# Patient Record
Sex: MALE | Race: WHITE | NOT HISPANIC OR LATINO
[De-identification: names, ages, dates, MRNs, and addresses within clinical notes are randomized per-mention and may not be internally consistent; named-entity substitution may affect disease eponyms.]

---
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
## 2021-02-04 ENCOUNTER — APPOINTMENT (OUTPATIENT)
Dept: PEDIATRICS | Facility: CLINIC | Age: 21
End: 2021-02-04
Payer: MEDICAID

## 2021-02-04 VITALS — TEMPERATURE: 99.1 F

## 2021-02-04 DIAGNOSIS — G43.909 MIGRAINE, UNSPECIFIED, NOT INTRACTABLE, W/OUT STATUS MIGRAINOSUS: ICD-10-CM

## 2021-02-04 PROCEDURE — 99072 ADDL SUPL MATRL&STAF TM PHE: CPT

## 2021-02-04 PROCEDURE — 99213 OFFICE O/P EST LOW 20 MIN: CPT

## 2021-02-04 NOTE — HISTORY OF PRESENT ILLNESS
[FreeTextEntry6] : h/o COVID , had AB's , will repeat AB's , left frontal headaches, for one year, pulsing, ice pick, not taking meds, relative with headaches, mother with headaches, maybe migraine

## 2021-02-08 LAB
SARS-COV-2 IGG SERPL IA-ACNC: 1.05 INDEX
SARS-COV-2 IGG SERPL QL IA: NEGATIVE

## 2021-12-19 ENCOUNTER — APPOINTMENT (OUTPATIENT)
Dept: PEDIATRICS | Facility: CLINIC | Age: 21
End: 2021-12-19
Payer: COMMERCIAL

## 2021-12-19 PROCEDURE — 99214 OFFICE O/P EST MOD 30 MIN: CPT

## 2021-12-19 RX ORDER — CEFUROXIME AXETIL 500 MG/1
500 TABLET ORAL
Qty: 20 | Refills: 0 | Status: COMPLETED | COMMUNITY
Start: 2021-12-19 | End: 2021-12-29

## 2021-12-19 RX ORDER — FLUTICASONE PROPIONATE 50 UG/1
50 SPRAY, METERED NASAL
Qty: 1 | Refills: 2 | Status: ACTIVE | COMMUNITY
Start: 2021-12-19 | End: 1900-01-01

## 2021-12-21 RX ORDER — AMOXICILLIN 500 MG/1
500 CAPSULE ORAL
Qty: 40 | Refills: 0 | Status: COMPLETED | COMMUNITY
Start: 2021-07-02

## 2021-12-21 RX ORDER — ALBUTEROL SULFATE 90 UG/1
108 (90 BASE) INHALANT RESPIRATORY (INHALATION)
Qty: 7 | Refills: 0 | Status: COMPLETED | COMMUNITY
Start: 2021-11-06

## 2021-12-21 RX ORDER — CLARITHROMYCIN 500 MG/1
500 TABLET, FILM COATED ORAL
Qty: 20 | Refills: 0 | Status: COMPLETED | COMMUNITY
Start: 2021-07-02

## 2021-12-21 RX ORDER — OMEPRAZOLE 40 MG/1
40 CAPSULE, DELAYED RELEASE ORAL
Qty: 30 | Refills: 0 | Status: COMPLETED | COMMUNITY
Start: 2020-11-27

## 2021-12-21 NOTE — HISTORY OF PRESENT ILLNESS
[FreeTextEntry6] : Patient has had a stuffy nose for months.  Recently though, he has felt a little sick.  He has had a sore throat and a cough.

## 2021-12-21 NOTE — PHYSICAL EXAM
[Mucoid Discharge] : mucoid discharge [Supple] : supple [Soft] : soft [NonTender] : non tender [NL] : normotonic [FreeTextEntry5] : Conjunctiva and sclera are clear bilaterally  [de-identified] : The area of discomfort follows  the dermatome of T7 or maybe T8. It is mainly in the flank. He was able to feel pinpricks equally in that area as opposed to other areas. There was no tenderness on palpation of the area.

## 2021-12-29 RX ORDER — AMOXICILLIN AND CLAVULANATE POTASSIUM 875; 125 MG/1; MG/1
875-125 TABLET, COATED ORAL
Qty: 20 | Refills: 0 | Status: ACTIVE | COMMUNITY
Start: 2021-11-06 | End: 1900-01-01

## 2022-05-21 ENCOUNTER — APPOINTMENT (OUTPATIENT)
Dept: PEDIATRICS | Facility: CLINIC | Age: 22
End: 2022-05-21
Payer: MEDICAID

## 2022-05-21 VITALS — TEMPERATURE: 97.7 F | WEIGHT: 182 LBS

## 2022-05-21 DIAGNOSIS — J02.9 ACUTE PHARYNGITIS, UNSPECIFIED: ICD-10-CM

## 2022-05-21 DIAGNOSIS — Z20.822 CONTACT WITH AND (SUSPECTED) EXPOSURE TO COVID-19: ICD-10-CM

## 2022-05-21 DIAGNOSIS — R09.81 NASAL CONGESTION: ICD-10-CM

## 2022-05-21 PROCEDURE — 87880 STREP A ASSAY W/OPTIC: CPT | Mod: QW

## 2022-05-21 PROCEDURE — 99214 OFFICE O/P EST MOD 30 MIN: CPT

## 2022-05-21 RX ORDER — LEVOCETIRIZINE DIHYDROCHLORIDE 5 MG/1
5 TABLET ORAL
Qty: 40 | Refills: 0 | Status: ACTIVE | COMMUNITY
Start: 2022-05-21 | End: 1900-01-01

## 2022-05-21 RX ORDER — FLUTICASONE PROPIONATE 50 UG/1
50 SPRAY, METERED NASAL TWICE DAILY
Qty: 1 | Refills: 1 | Status: ACTIVE | COMMUNITY
Start: 2022-05-21 | End: 1900-01-01

## 2022-05-21 NOTE — DISCUSSION/SUMMARY
[FreeTextEntry1] : 2 yo c/o fever, sore throat, body aches, nasal congestion\par Covid @  home this acshort while ago NEGATIVE\par PE afebrile, sweaty\par allergic shiners\par nasal congestion\par mod red OP\par tonsillar LAP ttp\par RST: Neg\par NP swab obtained\par Pharyngitis: suggest gargle w salt water, humidifier\par nasal congestion:Fluticasone nasal, Levocetiizine 5 mg bid\par If symptoms worsen or concerned, call/return to office.\par Questions answered.\par

## 2022-05-21 NOTE — PHYSICAL EXAM
[No Acute Distress] : no acute distress [Cerumen in canal] : cerumen in canal [Clear to Auscultation Bilaterally] : clear to auscultation bilaterally [Regular Rate and Rhythm] : regular rate and rhythm [No Murmurs] : no murmurs [Soft] : soft [NonTender] : non tender [Non Distended] : non distended [Normal Bowel Sounds] : normal bowel sounds [No Hepatosplenomegaly] : no hepatosplenomegaly [NL] : warm [FreeTextEntry1] : sweaty,  [FreeTextEntry5] : allergic shiners [FreeTextEntry4] : nasal congestion [de-identified] : moderately red OP [de-identified] : Tonsillar nodes ttp

## 2022-05-23 ENCOUNTER — APPOINTMENT (OUTPATIENT)
Dept: PEDIATRICS | Facility: CLINIC | Age: 22
End: 2022-05-23
Payer: MEDICAID

## 2022-05-23 VITALS — TEMPERATURE: 99.3 F

## 2022-05-23 DIAGNOSIS — J32.9 CHRONIC SINUSITIS, UNSPECIFIED: ICD-10-CM

## 2022-05-23 LAB — SARS-COV-2 N GENE NPH QL NAA+PROBE: NOT DETECTED

## 2022-05-23 PROCEDURE — 99213 OFFICE O/P EST LOW 20 MIN: CPT

## 2022-05-23 RX ORDER — AMOXICILLIN AND CLAVULANATE POTASSIUM 875; 125 MG/1; MG/1
875-125 TABLET, COATED ORAL
Qty: 20 | Refills: 0 | Status: ACTIVE | COMMUNITY
Start: 2022-05-23 | End: 1900-01-01

## 2022-05-24 NOTE — REVIEW OF SYSTEMS
[Malaise] : malaise [Headache] : headache [Nasal Congestion] : nasal congestion [Sinus Pressure] : sinus pressure [Cough] : cough [Negative] : Genitourinary

## 2022-05-25 LAB — BACTERIA THROAT CULT: ABNORMAL

## 2023-11-28 ENCOUNTER — NON-APPOINTMENT (OUTPATIENT)
Age: 23
End: 2023-11-28

## 2024-12-07 ENCOUNTER — NON-APPOINTMENT (OUTPATIENT)
Age: 24
End: 2024-12-07

## 2025-06-02 ENCOUNTER — TRANSCRIPTION ENCOUNTER (OUTPATIENT)
Age: 25
End: 2025-06-02

## 2025-06-02 ENCOUNTER — APPOINTMENT (OUTPATIENT)
Dept: SURGERY | Facility: CLINIC | Age: 25
End: 2025-06-02
Payer: MEDICAID

## 2025-06-02 ENCOUNTER — NON-APPOINTMENT (OUTPATIENT)
Age: 25
End: 2025-06-02

## 2025-06-02 VITALS
WEIGHT: 176 LBS | DIASTOLIC BLOOD PRESSURE: 74 MMHG | OXYGEN SATURATION: 97 % | BODY MASS INDEX: 23.33 KG/M2 | SYSTOLIC BLOOD PRESSURE: 146 MMHG | TEMPERATURE: 97.6 F | HEIGHT: 73 IN | HEART RATE: 63 BPM

## 2025-06-02 PROCEDURE — 99203 OFFICE O/P NEW LOW 30 MIN: CPT | Mod: 25

## 2025-06-03 ENCOUNTER — APPOINTMENT (OUTPATIENT)
Dept: SURGERY | Facility: CLINIC | Age: 25
End: 2025-06-03

## 2025-06-03 VITALS
OXYGEN SATURATION: 97 % | WEIGHT: 176 LBS | DIASTOLIC BLOOD PRESSURE: 84 MMHG | HEIGHT: 73 IN | HEART RATE: 55 BPM | TEMPERATURE: 97.2 F | BODY MASS INDEX: 23.33 KG/M2 | SYSTOLIC BLOOD PRESSURE: 150 MMHG

## 2025-06-03 DIAGNOSIS — R22.2 LOCALIZED SWELLING, MASS AND LUMP, TRUNK: ICD-10-CM

## 2025-06-03 PROCEDURE — 27047 EXC HIP/PELVIS LES SC < 3 CM: CPT | Mod: LT

## 2025-06-10 LAB — CORE LAB BIOPSY: NORMAL

## 2025-06-16 ENCOUNTER — APPOINTMENT (OUTPATIENT)
Dept: SURGERY | Facility: CLINIC | Age: 25
End: 2025-06-16
Payer: MEDICAID

## 2025-06-16 VITALS
BODY MASS INDEX: 23.33 KG/M2 | HEART RATE: 55 BPM | WEIGHT: 176 LBS | DIASTOLIC BLOOD PRESSURE: 84 MMHG | OXYGEN SATURATION: 99 % | HEIGHT: 73 IN | SYSTOLIC BLOOD PRESSURE: 105 MMHG | TEMPERATURE: 98.4 F

## 2025-06-16 PROCEDURE — 99024 POSTOP FOLLOW-UP VISIT: CPT
